# Patient Record
Sex: FEMALE | Race: WHITE
[De-identification: names, ages, dates, MRNs, and addresses within clinical notes are randomized per-mention and may not be internally consistent; named-entity substitution may affect disease eponyms.]

---

## 2018-08-31 ENCOUNTER — HOSPITAL ENCOUNTER (EMERGENCY)
Dept: HOSPITAL 80 - FED | Age: 18
Discharge: HOME | End: 2018-08-31
Payer: SELF-PAY

## 2018-08-31 VITALS — DIASTOLIC BLOOD PRESSURE: 73 MMHG | SYSTOLIC BLOOD PRESSURE: 114 MMHG

## 2018-08-31 DIAGNOSIS — R10.13: Primary | ICD-10-CM

## 2018-08-31 LAB — PLATELET # BLD: 211 10^3/UL (ref 150–400)

## 2018-08-31 NOTE — EDPHY
H & P


Stated Complaint: c/o mid upper abd pain x 2 days, vomiting initially - none 

today





- Medical/Surgical History


Hx Asthma: No


Hx Chronic Respiratory Disease: No


Hx Diabetes: No


Hx Cardiac Disease: No


Hx Renal Disease: No


Hx Cirrhosis: No


Hx Alcoholism: No


Hx HIV/AIDS: No


Hx Splenectomy or Spleen Trauma: No


Other PMH: none





- Social History


Smoking Status: Never smoked


Time Seen by Provider: 08/31/18 17:58


HPI/ROS: 





CHIEF COMPLAINT:  Intermittent Epigastric pain x2 days





HISTORY OF PRESENT ILLNESS:  18-year-old female generally healthy complaining 

of intermittent epigastric discomfort for the past 2 days.  Today she [facility] 

[facility] and felt pain in the epigastrium which is now resolved.  No 

nausea or vomiting.  No radiation pain.  Bowel movements normal.  No urinary 

abnormality.  No dyspnea or coughing.





PRIMARY CARE PROVIDER: [facility] 





REVIEW OF SYSTEMS:


10 systems reviewed and negative with exception of illness mention the history 

of present illness








PAST MEDICAL & SURGICAL  HISTORY:  No history of abdominal surgeries.  No 

exogenous estrogen use or oral contraceptive use.





SOCIAL HISTORY: Nonsmoker.    














************


PHYSICAL EXAM





(Prior to examination, patient consented to physical exam, hands were washed 

and my usual and customary physical exam procedures followed)


1) GENERAL: Well-developed, well-nourished, alert and oriented.  Appears to be 

in no acute distress.


2) HEAD: Normocephalic, atraumatic


3) HEENT: Pupils equal, round, reactive to light bilaterally.  Sclera 

anicteric.  Nasopharynx, oropharynx, clear, no lesions. Moist Mucous membranes.


4) NECK: Full range of motion, no meningeal signs.


5) LUNGS: Clear auscultation bilaterally, no wheezes, no rhonchi, no 

retractions.   


6) HEART: Regular rate and rhythm, no murmur, no heave, no gallop.


7) ABDOMEN: No guarding, no rebound, no focal tenderness, negative McBurney's, 

negative Pantoja's, negative Rovsing's, negative peritoneal sign, I am unable to 

elicit any abdominal pain on exam.


8) MUSCULOSKELETAL: Moving all extremities, no focal areas of tenderness, no 

obvious trauma.  No peripheral edema or discoloration.


9) BACK: No CVA tenderness, no midline vertebral tenderness, no fluctuance, no 

step-off, no obvious trauma, no visual or palpable abnormality. 


10) SKIN: No rash, no petechiae. 


11) Psychiatric:  Patient is oriented X 3, there is no agitation.








***************





DIFFERENTIAL DIAGNOSIS:   In no particular order,  including but not limited to 

biliary colic, cholecystitis, peptic ulcer disease, pancreatitis, and 

gastroenteritis. This is a partial list of diagnoses considered. These 

considerations are based on history, physical exam, past history and 

reassessment.


 (VESNA Guerrero)


Constitutional: 





 Initial Vital Signs











Temperature (C)  36.8 C   08/31/18 17:27


 


Heart Rate  63   08/31/18 17:27


 


Respiratory Rate  16   08/31/18 17:27


 


Blood Pressure  122/75 H  08/31/18 17:27


 


O2 Sat (%)  96   08/31/18 17:27








 











O2 Delivery Mode               Room Air














Allergies/Adverse Reactions: 


 





No Known Allergies Allergy (Unverified 08/31/18 17:30)


 








Home Medications: 














 Medication  Instructions  Recorded


 


Pantoprazole Sodium [Protonix 40mg 40 mg PO DAILY #30 tab 08/31/18





(RX)]  














Medical Decision Making


ED Course/Re-evaluation: 





6:15 p.m.:  I saw this patient independently based on established practice 

protocols.  Care of patient under supervision of  secondary supervising 

physician Dr Wolf with whom I discussed case.  At this time I think that 

acute cholecystitis is less than likely in this otherwise healthy 18-year-old 

female with no history of obesity, no concurrent medical conditions.  We 

discussed other possible etiologies such as lower lobe pneumonia which I think 

is less than likely given her absence of respiratory complaints, clear lungs.  

I also think that lower lobe pulmonary embolus less than likely in this patient 

with negative perc score.  Will obtain laboratory studies, administer GI 

cocktail and re-evaluated.  Discussed possibility of peptic ulcer disease or 

gastritis as the etiology of her symptoms.





7:30 p.m.:  Re-evaluation, patient has been given GI cocktail states that she 

is feeling well.  Re-examined at this time.  Her abdomen is soft no guarding no 

rebound no McBurney's point pain, negative Pantoja sign.  Think that acute 

cholecystitis is less than likely.  Doubt acute pancreatitis the presence of 

normal lipase.  Discussed possibility of gastritis and/or peptic ulcer disease.

  At this time I do not think that emergent GI consultation is indicated.  I 

recommended initiation of proton pump inhibitor therapy, given my usual and 

customary dietary precautions instructions.  Definitely if she develops new or 

worsening symptoms recommend she come to the ER for for re-evaluation.  Doubt 

pulmonary pathology.  I saw this patient independently based on established 

practice protocols.  Care of patient under supervision of  secondary 

supervising physician Dr Wolf (YolandaVESNA)





I did not see this patient while she was in the emergency department.  However 

her care was discussed with the PA while the patient was in the department.  I 

agree with treatment plan and management (Moncho Wolf)





- Data Points


Laboratory Results: 





 Laboratory Results





 08/31/18 18:18 





 08/31/18 18:18 





 











  08/31/18 08/31/18 08/31/18





  18:18 18:18 18:18


 


WBC      





    


 


RBC      





    


 


Hgb      





    


 


Hct      





    


 


MCV      





    


 


MCH      





    


 


MCHC      





    


 


RDW      





    


 


Plt Count      





    


 


MPV      





    


 


Neut % (Auto)      





    


 


Lymph % (Auto)      





    


 


Mono % (Auto)      





    


 


Eos % (Auto)      





    


 


Baso % (Auto)      





    


 


Nucleat RBC Rel Count      





    


 


Absolute Neuts (auto)      





    


 


Absolute Lymphs (auto)      





    


 


Absolute Monos (auto)      





    


 


Absolute Eos (auto)      





    


 


Absolute Basos (auto)      





    


 


Absolute Nucleated RBC      





    


 


Immature Gran %      





    


 


Immature Gran #      





    


 


Sodium      140 mEq/L mEq/L





     (135-145) 


 


Potassium      4.1 mEq/L mEq/L





     (3.3-5.0) 


 


Chloride      104 mEq/L mEq/L





     () 


 


Carbon Dioxide      27 mEq/l mEq/l





     (22-31) 


 


Anion Gap      9 mEq/L mEq/L





     (8-16) 


 


BUN      13 mg/dL mg/dL





     (7-23) 


 


Creatinine      0.9 mg/dL mg/dL





     (0.6-1.0) 


 


Estimated GFR      > 60 





    


 


Glucose      85 mg/dL mg/dL





     () 


 


Calcium      9.5 mg/dL mg/dL





     (8.5-10.4) 


 


Total Bilirubin      0.4 mg/dL mg/dL





     (0.1-1.4) 


 


Conjugated Bilirubin      0.2 mg/dL mg/dL





     (0.0-0.5) 


 


Unconjugated Bilirubin      0.2 mg/dL mg/dL





     (0.0-1.1) 


 


AST      33 IU/L IU/L





     (14-46) 


 


ALT      29 IU/L IU/L





     (9-52) 


 


Alkaline Phosphatase      42 IU/L IU/L





     () 


 


Total Protein      7.2 g/dL g/dL





     (6.3-8.2) 


 


Albumin      4.3 g/dL g/dL





     (3.5-5.0) 


 


Lipase      215 IU/L IU/L





     () 


 


Beta HCG, Qual    NEGATIVE   





    


 


Urine Color  PALE YELLOW     





    


 


Urine Appearance  CLEAR     





    


 


Urine pH  7.0     





   (5.0-7.5)   


 


Ur Specific Gravity  1.009     





   (1.002-1.030)   


 


Urine Protein  NEGATIVE     





   (NEGATIVE)   


 


Urine Ketones  NEGATIVE     





   (NEGATIVE)   


 


Urine Blood  NEGATIVE     





   (NEGATIVE)   


 


Urine Nitrate  NEGATIVE     





   (NEGATIVE)   


 


Urine Bilirubin  NEGATIVE     





   (NEGATIVE)   


 


Urine Urobilinogen  NEGATIVE EU EU    





   (0.2-1.0)   


 


Ur Leukocyte Esterase  NEGATIVE     





   (NEGATIVE)   


 


Urine RBC  NONE SEEN /hpf /hpf    





   (0-3)   


 


Urine WBC  0-1 /hpf /hpf    





   (0-3)   


 


Ur Epithelial Cells  TRACE /lpf /lpf    





   (NONE-1+)   


 


Urine Mucus  TRACE /lpf /lpf    





   (NONE-1+)   


 


Urine Glucose  NEGATIVE     





   (NEGATIVE)   














  08/31/18





  18:18


 


WBC  6.50 10^3/uL 10^3/uL





   (3.80-9.50) 


 


RBC  4.72 10^6/uL 10^6/uL





   (4.18-5.33) 


 


Hgb  13.9 g/dL g/dL





   (12.6-16.3) 


 


Hct  41.4 % %





   (38.0-47.0) 


 


MCV  87.7 fL fL





   (81.5-99.8) 


 


MCH  29.4 pg pg





   (27.9-34.1) 


 


MCHC  33.6 g/dL g/dL





   (32.4-36.7) 


 


RDW  12.9 % %





   (11.5-15.2) 


 


Plt Count  211 10^3/uL 10^3/uL





   (150-400) 


 


MPV  10.9 fL fL





   (8.7-11.7) 


 


Neut % (Auto)  60.8 % %





   (39.3-74.2) 


 


Lymph % (Auto)  27.8 % %





   (15.0-45.0) 


 


Mono % (Auto)  8.8 % %





   (4.5-13.0) 


 


Eos % (Auto)  1.8 % %





   (0.6-7.6) 


 


Baso % (Auto)  0.5 % %





   (0.3-1.7) 


 


Nucleat RBC Rel Count  0.0 % %





   (0.0-0.2) 


 


Absolute Neuts (auto)  3.95 10^3/uL 10^3/uL





   (1.70-6.50) 


 


Absolute Lymphs (auto)  1.81 10^3/uL 10^3/uL





   (1.00-3.00) 


 


Absolute Monos (auto)  0.57 10^3/uL 10^3/uL





   (0.30-0.80) 


 


Absolute Eos (auto)  0.12 10^3/uL 10^3/uL





   (0.03-0.40) 


 


Absolute Basos (auto)  0.03 10^3/uL 10^3/uL





   (0.02-0.10) 


 


Absolute Nucleated RBC  0.00 10^3/uL 10^3/uL





   (0-0.01) 


 


Immature Gran %  0.3 % %





   (0.0-1.1) 


 


Immature Gran #  0.02 10^3/uL 10^3/uL





   (0.00-0.10) 


 


Sodium  





  


 


Potassium  





  


 


Chloride  





  


 


Carbon Dioxide  





  


 


Anion Gap  





  


 


BUN  





  


 


Creatinine  





  


 


Estimated GFR  





  


 


Glucose  





  


 


Calcium  





  


 


Total Bilirubin  





  


 


Conjugated Bilirubin  





  


 


Unconjugated Bilirubin  





  


 


AST  





  


 


ALT  





  


 


Alkaline Phosphatase  





  


 


Total Protein  





  


 


Albumin  





  


 


Lipase  





  


 


Beta HCG, Qual  





  


 


Urine Color  





  


 


Urine Appearance  





  


 


Urine pH  





  


 


Ur Specific Gravity  





  


 


Urine Protein  





  


 


Urine Ketones  





  


 


Urine Blood  





  


 


Urine Nitrate  





  


 


Urine Bilirubin  





  


 


Urine Urobilinogen  





  


 


Ur Leukocyte Esterase  





  


 


Urine RBC  





  


 


Urine WBC  





  


 


Ur Epithelial Cells  





  


 


Urine Mucus  





  


 


Urine Glucose  





  











Medications Given: 





 








Discontinued Medications





Al Hydroxide/Mg Hydroxide (Maalox Susp)  30 ml PO ONCE ONE


   Stop: 08/31/18 18:15


   Last Admin: 08/31/18 18:28 Dose:  30 ml


Hyoscyamine Sulfate (Levsin, Hyomax-Sl)  0.25 mg PO ONCE ONE


   Stop: 08/31/18 18:15


   Last Admin: 08/31/18 18:28 Dose:  0.25 mg


Lidocaine (Lidocaine 2% Viscous)  15 ml PO ONCE ONE


   Stop: 08/31/18 18:15


   Last Admin: 08/31/18 18:28 Dose:  15 ml








Departure





- Departure


Disposition: Home, Routine, Self-Care


Clinical Impression: 


Abdominal pain


Qualifiers:


 Abdominal location: epigastric Qualified Code(s): R10.13 - Epigastric pain





Condition: Good


Instructions:  Epigastric Pain (ED)


Additional Instructions: 


Seek immediate medical attention if you develop new or worsening symptoms, if 

you develop fevers, chills, inability to tolerate oral intake or any other 

symptoms that concerns you.  Recommend you eat bland, low-fat, non acidic foods 

as we discussed.


Referrals: 


Jeremiah Vuong MD [Medical Doctor] - 5-7 days, call for appt.


Prescriptions: 


Pantoprazole Sodium [Protonix 40mg (RX)] 40 mg PO DAILY #30 tab